# Patient Record
Sex: FEMALE | Race: WHITE
[De-identification: names, ages, dates, MRNs, and addresses within clinical notes are randomized per-mention and may not be internally consistent; named-entity substitution may affect disease eponyms.]

---

## 2017-06-22 ENCOUNTER — HOSPITAL ENCOUNTER (EMERGENCY)
Dept: HOSPITAL 38 - CC.ED | Age: 32
Discharge: HOME | End: 2017-06-22
Payer: MEDICAID

## 2017-06-22 VITALS — SYSTOLIC BLOOD PRESSURE: 128 MMHG | DIASTOLIC BLOOD PRESSURE: 72 MMHG

## 2017-06-22 DIAGNOSIS — Z88.0: ICD-10-CM

## 2017-06-22 DIAGNOSIS — F17.210: ICD-10-CM

## 2017-06-22 DIAGNOSIS — Z91.030: ICD-10-CM

## 2017-06-22 DIAGNOSIS — Z98.51: ICD-10-CM

## 2017-06-22 DIAGNOSIS — N39.0: Primary | ICD-10-CM

## 2017-06-22 LAB
CHLORIDE SERPL-SCNC: 105 MEQ/L (ref 98–106)
SODIUM SERPL-SCNC: 140 MEQ/L (ref 136–145)
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
## 2018-05-29 ENCOUNTER — HOSPITAL ENCOUNTER (EMERGENCY)
Dept: HOSPITAL 38 - CC.ED | Age: 33
Discharge: HOME | End: 2018-05-29
Payer: COMMERCIAL

## 2018-05-29 VITALS — DIASTOLIC BLOOD PRESSURE: 80 MMHG | SYSTOLIC BLOOD PRESSURE: 133 MMHG

## 2018-05-29 DIAGNOSIS — Z91.030: ICD-10-CM

## 2018-05-29 DIAGNOSIS — Z88.0: ICD-10-CM

## 2018-05-29 DIAGNOSIS — N73.9: ICD-10-CM

## 2018-05-29 DIAGNOSIS — Z88.1: ICD-10-CM

## 2018-05-29 DIAGNOSIS — F17.210: ICD-10-CM

## 2018-05-29 DIAGNOSIS — N39.0: Primary | ICD-10-CM

## 2018-05-29 LAB
CHLORIDE SERPL-SCNC: 107 MEQ/L (ref 98–106)
SODIUM SERPL-SCNC: 143 MEQ/L (ref 136–145)

## 2018-05-29 PROCEDURE — 96372 THER/PROPH/DIAG INJ SC/IM: CPT

## 2018-05-29 PROCEDURE — 80053 COMPREHEN METABOLIC PANEL: CPT

## 2018-05-29 PROCEDURE — 87070 CULTURE OTHR SPECIMN AEROBIC: CPT

## 2018-05-29 PROCEDURE — 87086 URINE CULTURE/COLONY COUNT: CPT

## 2018-05-29 PROCEDURE — 84703 CHORIONIC GONADOTROPIN ASSAY: CPT

## 2018-05-29 PROCEDURE — 85025 COMPLETE CBC W/AUTO DIFF WBC: CPT

## 2018-05-29 PROCEDURE — 81001 URINALYSIS AUTO W/SCOPE: CPT

## 2018-05-29 PROCEDURE — 36415 COLL VENOUS BLD VENIPUNCTURE: CPT

## 2018-05-29 PROCEDURE — 99284 EMERGENCY DEPT VISIT MOD MDM: CPT

## 2018-05-29 PROCEDURE — 86140 C-REACTIVE PROTEIN: CPT
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
